# Patient Record
Sex: FEMALE | Race: WHITE
[De-identification: names, ages, dates, MRNs, and addresses within clinical notes are randomized per-mention and may not be internally consistent; named-entity substitution may affect disease eponyms.]

---

## 2019-10-25 PROBLEM — Z00.00 ENCOUNTER FOR PREVENTIVE HEALTH EXAMINATION: Status: ACTIVE | Noted: 2019-10-25

## 2019-11-01 ENCOUNTER — APPOINTMENT (OUTPATIENT)
Dept: FAMILY MEDICINE | Facility: CLINIC | Age: 24
End: 2019-11-01
Payer: COMMERCIAL

## 2019-11-01 VITALS
TEMPERATURE: 98 F | BODY MASS INDEX: 24.03 KG/M2 | SYSTOLIC BLOOD PRESSURE: 100 MMHG | HEIGHT: 62 IN | WEIGHT: 130.6 LBS | DIASTOLIC BLOOD PRESSURE: 80 MMHG | HEART RATE: 98 BPM | OXYGEN SATURATION: 98 %

## 2019-11-01 DIAGNOSIS — R20.0 ANESTHESIA OF SKIN: ICD-10-CM

## 2019-11-01 PROCEDURE — 36415 COLL VENOUS BLD VENIPUNCTURE: CPT

## 2019-11-01 PROCEDURE — 99213 OFFICE O/P EST LOW 20 MIN: CPT | Mod: 25

## 2019-11-01 NOTE — HISTORY OF PRESENT ILLNESS
[Musculoskeletal Symptoms Legs] : leg [Worsening] : worsening [FreeTextEntry1] : years off and on [FreeTextEntry3] : moderate-severe [FreeTextEntry8] : Complaining of bilateral lower extremity numbness, no tingling. Feels heavy, and throbbing. 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

## 2019-11-01 NOTE — PHYSICAL EXAM
[Normal] : normal rate, regular rhythm, normal S1 and S2 and no murmur heard [Coordination Grossly Intact] : coordination grossly intact [No Focal Deficits] : no focal deficits [de-identified] : Reflexes are two over four bilateral knee jerks, negative for plantar reflex, negative clonus

## 2019-11-08 LAB
25(OH)D3 SERPL-MCNC: 37.5 NG/ML
ALBUMIN SERPL ELPH-MCNC: 4.6 G/DL
ALP BLD-CCNC: 39 U/L
ALT SERPL-CCNC: 7 U/L
ANION GAP SERPL CALC-SCNC: 11 MMOL/L
AST SERPL-CCNC: 6 U/L
BASOPHILS # BLD AUTO: 0.04 K/UL
BASOPHILS NFR BLD AUTO: 0.5 %
BILIRUB SERPL-MCNC: 0.6 MG/DL
BUN SERPL-MCNC: 12 MG/DL
CALCIUM SERPL-MCNC: 9.6 MG/DL
CHLORIDE SERPL-SCNC: 105 MMOL/L
CHOLEST SERPL-MCNC: 181 MG/DL
CHOLEST/HDLC SERPL: 3.1 RATIO
CO2 SERPL-SCNC: 24 MMOL/L
CREAT SERPL-MCNC: 0.78 MG/DL
EOSINOPHIL # BLD AUTO: 0.08 K/UL
EOSINOPHIL NFR BLD AUTO: 1 %
FOLATE SERPL-MCNC: 19.2 NG/ML
GLUCOSE SERPL-MCNC: 90 MG/DL
HCT VFR BLD CALC: 39.5 %
HDLC SERPL-MCNC: 58 MG/DL
HGB BLD-MCNC: 12.8 G/DL
IMM GRANULOCYTES NFR BLD AUTO: 0.2 %
IRON SATN MFR SERPL: 29 %
IRON SERPL-MCNC: 120 UG/DL
LDLC SERPL CALC-MCNC: 108 MG/DL
LYMPHOCYTES # BLD AUTO: 2.89 K/UL
LYMPHOCYTES NFR BLD AUTO: 34.7 %
MAN DIFF?: NORMAL
MCHC RBC-ENTMCNC: 31.6 PG
MCHC RBC-ENTMCNC: 32.4 GM/DL
MCV RBC AUTO: 97.5 FL
MONOCYTES # BLD AUTO: 0.52 K/UL
MONOCYTES NFR BLD AUTO: 6.3 %
NEUTROPHILS # BLD AUTO: 4.77 K/UL
NEUTROPHILS NFR BLD AUTO: 57.3 %
PLATELET # BLD AUTO: 225 K/UL
POTASSIUM SERPL-SCNC: 4.7 MMOL/L
PROT SERPL-MCNC: 6.9 G/DL
RBC # BLD: 4.05 M/UL
RBC # FLD: 11.8 %
SODIUM SERPL-SCNC: 140 MMOL/L
T4 FREE SERPL-MCNC: 1.4 NG/DL
TIBC SERPL-MCNC: 407 UG/DL
TRIGL SERPL-MCNC: 75 MG/DL
TSH SERPL-ACNC: 1.11 UIU/ML
UIBC SERPL-MCNC: 287 UG/DL
VIT B12 SERPL-MCNC: 217 PG/ML
WBC # FLD AUTO: 8.32 K/UL

## 2019-11-15 ENCOUNTER — TRANSCRIPTION ENCOUNTER (OUTPATIENT)
Age: 24
End: 2019-11-15

## 2019-11-15 ENCOUNTER — APPOINTMENT (OUTPATIENT)
Dept: FAMILY MEDICINE | Facility: CLINIC | Age: 24
End: 2019-11-15

## 2020-01-14 ENCOUNTER — APPOINTMENT (OUTPATIENT)
Dept: FAMILY MEDICINE | Facility: CLINIC | Age: 25
End: 2020-01-14
Payer: COMMERCIAL

## 2020-01-14 VITALS
SYSTOLIC BLOOD PRESSURE: 112 MMHG | HEART RATE: 100 BPM | TEMPERATURE: 98.5 F | HEIGHT: 62 IN | OXYGEN SATURATION: 98 % | BODY MASS INDEX: 24.75 KG/M2 | DIASTOLIC BLOOD PRESSURE: 78 MMHG | WEIGHT: 134.5 LBS

## 2020-01-14 DIAGNOSIS — J10.1 INFLUENZA DUE TO OTHER IDENTIFIED INFLUENZA VIRUS WITH OTHER RESPIRATORY MANIFESTATIONS: ICD-10-CM

## 2020-01-14 DIAGNOSIS — R09.81 NASAL CONGESTION: ICD-10-CM

## 2020-01-14 DIAGNOSIS — R05 COUGH: ICD-10-CM

## 2020-01-14 DIAGNOSIS — Z87.09 PERSONAL HISTORY OF OTHER DISEASES OF THE RESPIRATORY SYSTEM: ICD-10-CM

## 2020-01-14 LAB
FLUAV SPEC QL CULT: NORMAL
FLUBV AG SPEC QL IA: NORMAL
S PYO AG SPEC QL IA: NORMAL

## 2020-01-14 PROCEDURE — 36415 COLL VENOUS BLD VENIPUNCTURE: CPT

## 2020-01-14 PROCEDURE — 87880 STREP A ASSAY W/OPTIC: CPT | Mod: QW

## 2020-01-14 PROCEDURE — 87804 INFLUENZA ASSAY W/OPTIC: CPT | Mod: QW

## 2020-01-14 PROCEDURE — 99214 OFFICE O/P EST MOD 30 MIN: CPT | Mod: 25

## 2020-01-14 RX ORDER — OSELTAMIVIR PHOSPHATE 75 MG/1
75 CAPSULE ORAL TWICE DAILY
Qty: 1 | Refills: 0 | Status: ACTIVE | COMMUNITY
Start: 2020-01-14 | End: 1900-01-01

## 2020-01-14 RX ORDER — NORETHINDRONE ACETATE, ETHINYL ESTRADIOL AND FERROUS FUMARATE 1MG-20(24)
1-20 KIT ORAL
Qty: 84 | Refills: 0 | Status: ACTIVE | COMMUNITY
Start: 2020-01-04

## 2020-01-14 RX ORDER — FLUTICASONE PROPIONATE 50 UG/1
50 SPRAY, METERED NASAL DAILY
Qty: 1 | Refills: 0 | Status: ACTIVE | COMMUNITY
Start: 2020-01-14 | End: 1900-01-01

## 2020-01-14 RX ORDER — BENZONATATE 200 MG/1
200 CAPSULE ORAL 3 TIMES DAILY
Qty: 30 | Refills: 0 | Status: ACTIVE | COMMUNITY
Start: 2020-01-14 | End: 1900-01-01

## 2020-01-14 NOTE — COUNSELING
[None] : None [Good understanding] : Patient has a good understanding of lifestyle changes and steps needed to achieve self management goal [de-identified] : influenza prevention/infection control: \par CDC recommends yearly flu vaccination. \par Avoid close contact with those that have influenza.\par While sick with influenza- limit contact with healthy individuals as much as possible. The CDC recommends staying home (except to get medical care/other necessities) 24 hours after being free of fever without use of fever-reducing medication. Cover your nose/mouth with a tissue when you cough/sneeze and throw the tissue in the garbage after use. Wash your hands with soap and water often or alcohol-based hand  if not available, especially after blowing nose/using tissue. \par Avoid touching eyes, nose, mouth to prevent spread of infection.\par clean and disinfect surfaces and objects contaminated with germs. \par \par For more information you can visit: https://www.cdc.gov/flu/prevent/preventing.htm

## 2020-01-14 NOTE — HISTORY OF PRESENT ILLNESS
[Congestion] : congestion [Cold Symptoms] : cold symptoms [Chills] : chills [Sore Throat] : sore throat [Headache] : headache [Fatigue] : fatigue [Fever] : fever [Worsening] : worsening [Severe] : severe [___ Weeks ago] :  [unfilled] weeks ago [Constant] : constant [Anorexia] : anorexia [Tmax= ___] : Tmax = [unfilled] [OTC Remedies] : OTC remedies [At Night] : at night [In Morning] : in the morning [Wheezing] : no wheezing [Shortness Of Breath] : no shortness of breath [Earache] : no earache [de-identified] : non-productive cough [FreeTextEntry5] : robitussin, delsym, ibuprofen as directed.  [FreeTextEntry8] : This is a 23y/o  female here today for c/o 8/10 sinus congestion and non-productive cough, SLIDTA as above.\par denies flu or strep throat sick contacts. denies having received the flu vaccine this season. patient reports having self medicated with Augmentin 875mg bid x 3 doses that was unused at home, denies having taken antibiotic medication today.

## 2020-01-14 NOTE — PLAN
[FreeTextEntry1] : checked cbc w/ diff and throat culture pending - will f/u result. \par Start Tamiflu as prescribed for treatment of influenza B. notify our office if any s/s side effects or adverse reaction. for serious/potentially life threatening reactions- go to nearest emergency department and then notify our office.\par Start fluticasone nasal spray as prescribed for s/s sinus congestion.\par Start Benzonatate as prescribed for cough.\par OTC Tylenol or ibuprofen as directed for headache/pain/fever.\par OTC Xyzal HS as directed for symptom relief and sleep.\par OTC Claritin or Allegra as directed for post nasal drip.\par Stop taking Robitussin, Delsym, and STOP TAKING ANTIBIOTIC MEDICATION NOT PRESCRIBED. TAKE NEW ANTIBIOTIC AS PRESCRIBED FOR FULL COURSE OF TREATMENT AND DO NOT TAKE MEDICATIONS THAT ARE NOT PRESCRIBED FOR YOU. \par warm salt water gargles as needed for symptoms relief.\par get plenty of rest and fluids.\par infection prevention education as discussed above; view counseling section.\par notify/return to office if worsening/persistent symptoms or new onset complaints/concerns, in the event of any emergency or life threatening condition, go to the nearest emergency department and then notify office. \par patient verbalized understanding and agrees with plan of care.

## 2020-01-14 NOTE — HEALTH RISK ASSESSMENT
[No] : No [Yes] : In the past 12 months have you used drugs other than those required for medical reasons? Yes [No falls in past year] : Patient reported no falls in the past year [0] : 2) Feeling down, depressed, or hopeless: Not at all (0) [de-identified] : marijuana social no smoking vapes [] : No [[id number]] : 0

## 2020-01-14 NOTE — PHYSICAL EXAM
[Well Nourished] : well nourished [No Acute Distress] : no acute distress [Well Developed] : well developed [Normal TMs] : both tympanic membranes were normal [Ill-Appearing] : ill-appearing [PERRL] : pupils equal round and reactive to light [No Edema] : there was no peripheral edema [Normal] : soft, non-tender, non-distended, no masses palpated, no HSM and normal bowel sounds [Normal Posterior Cervical Nodes] : no posterior cervical lymphadenopathy [No Rash] : no rash [Normal Anterior Cervical Nodes] : no anterior cervical lymphadenopathy [Normal Affect] : the affect was normal [Normal Gait] : normal gait [Normal Insight/Judgement] : insight and judgment were intact [de-identified] : posterior pharynx erythema, +PND, no tonsillar swelling or exudate, sinus tenderness and facial pain on forward bend [de-identified] : LUNGS CRYSTAL CLEAR X5 LOBES

## 2020-01-14 NOTE — REVIEW OF SYSTEMS
[Fever] : fever [Fatigue] : fatigue [Chills] : chills [Hot Flashes] : hot flashes [Night Sweats] : night sweats [Postnasal Drip] : postnasal drip [Nasal Discharge] : nasal discharge [Sore Throat] : sore throat [Shortness Of Breath] : shortness of breath [Nausea] : nausea [Abdominal Pain] : abdominal pain [Headache] : headache [Swollen Glands] : swollen glands [Negative] : Psychiatric [Recent Change In Weight] : ~T no recent weight change [Hearing Loss] : no hearing loss [Nosebleed] : no nosebleeds [Hoarseness] : no hoarseness [Wheezing] : no wheezing [Constipation] : no constipation [Dyspnea on Exertion] : no dyspnea on exertion [Diarrhea] : diarrhea [Vomiting] : no vomiting [Heartburn] : no heartburn [Melena] : no melena [Dizziness] : no dizziness [Skin Rash] : no skin rash [Fainting] : no fainting [Confusion] : no confusion [Easy Bruising] : no easy bruising [Easy Bleeding] : no easy bleeding [FreeTextEntry4] : left ear pain [FreeTextEntry6] : non-productive cough [de-identified] : left side

## 2020-01-17 LAB
BACTERIA THROAT CULT: NORMAL
BASOPHILS # BLD AUTO: 0.01 K/UL
BASOPHILS NFR BLD AUTO: 0.3 %
EOSINOPHIL # BLD AUTO: 0.01 K/UL
EOSINOPHIL NFR BLD AUTO: 0.3 %
HCT VFR BLD CALC: 40 %
HGB BLD-MCNC: 13 G/DL
IMM GRANULOCYTES NFR BLD AUTO: 0 %
LYMPHOCYTES # BLD AUTO: 1.05 K/UL
LYMPHOCYTES NFR BLD AUTO: 29.3 %
MAN DIFF?: NORMAL
MCHC RBC-ENTMCNC: 31 PG
MCHC RBC-ENTMCNC: 32.5 GM/DL
MCV RBC AUTO: 95.5 FL
MONOCYTES # BLD AUTO: 0.35 K/UL
MONOCYTES NFR BLD AUTO: 9.8 %
NEUTROPHILS # BLD AUTO: 2.16 K/UL
NEUTROPHILS NFR BLD AUTO: 60.3 %
PLATELET # BLD AUTO: 131 K/UL
RBC # BLD: 4.19 M/UL
RBC # FLD: 11.9 %
WBC # FLD AUTO: 3.58 K/UL

## 2020-02-04 ENCOUNTER — RX RENEWAL (OUTPATIENT)
Age: 25
End: 2020-02-04

## 2020-12-23 PROBLEM — Z87.09 HISTORY OF SORE THROAT: Status: RESOLVED | Noted: 2020-01-14 | Resolved: 2020-12-23
